# Patient Record
(demographics unavailable — no encounter records)

---

## 2024-12-05 NOTE — PHYSICAL EXAM
[General Appearance - Alert] : alert [General Appearance - In No Acute Distress] : in no acute distress [General Appearance - Well Nourished] : well nourished [General Appearance - Well Developed] : well developed [Sclera] : the sclera and conjunctiva were normal [] : no rash [Motor Exam] : the motor exam was normal [FreeTextEntry1] : There is joint line tenderness of the elbows bilaterally. Ther eis tenderness over the lateral epicondyles bilaterally.  There is joint line tenderness in the right wrist without palpable swelling. There is right first MCP joint tenderness. There is no dactylitis. There is tenderness over the superior pole of the left patella. There is tenderness of the left first MTP joint. There is no plantar tenderness. There is no pain on stressing the SI joint during provocative testing.

## 2024-12-05 NOTE — REVIEW OF SYSTEMS
[Fever] : no fever [Chills] : no chills [Eye Pain] : no eye pain [Red Eyes] : eyes not red [Shortness Of Breath] : no shortness of breath [Cough] : no cough [Abdominal Pain] : no abdominal pain [Diarrhea] : no diarrhea [Dysuria] : no dysuria [Joint Pain] : joint pain [Joint Swelling] : no joint swelling [Joint Stiffness] : joint stiffness [Skin Lesions] : no skin lesions [FreeTextEntry6] : No pleuritic C/P

## 2024-12-05 NOTE — HISTORY OF PRESENT ILLNESS
[FreeTextEntry1] : Patient is no longer taking Naproxen. Noted progression of symptoms - having symptoms suggestive of enthesitis in multiple joints.

## 2025-02-26 NOTE — HISTORY OF PRESENT ILLNESS
[FreeTextEntry1] : The patient is a 45-year-old gentleman with recurrent kidney stones.  He had shockwave lithotripsy in the past.  He presented to the emergency room with right renal colic.  A CT scan showed a right 6 mm obstructing upper ureter stone.  The patient had : -stent placement 2/13/2025 - Right URS and removal of stone and stent 3/18/2025 - Readmitted with 2 mm fragment in ureter underwent ureteroscope and stent placement 3/21  He is here for stent removal and metabolic workup for stones.  Procedure: After patient was consented he was prepped and draped. He was then given Keflex and urojet was injected. The 16 Fr flexible cysto was introduced and the stent could be visualized in the bladder and using a flexible grasper this was grasped and removed uneventfully.  Patient provided urine specimen for culture.

## 2025-02-26 NOTE — ASSESSMENT
[FreeTextEntry1] :  Discussed with the patient dietery stone prevention includin- Increase flud intake to achieve at least 2.5-3 L daily 2- Limit sodium intake and consume 3498-8321 mg per day dietry calcium 3-  Increase their citrate intake by icreasing fruits, vegetables and limit non-dairy animal protein 4- If uric acid stone limit their non-dairy animal protein Patient was also counselled on metabolic workup of stones including 24 hour urine collection to better follow her stone management and prevention. RTC 6 weeks for renal ultrasound and to discuss litholink

## 2025-04-16 NOTE — HISTORY OF PRESENT ILLNESS
[FreeTextEntry1] : The patient is a 45-year-old gentleman with recurrent kidney stones. He had shockwave lithotripsy in the past. He presented to the emergency room with right renal colic. A CT scan showed a right 6 mm obstructing upper ureter stone. The patient had : -stent placement 2/13/2025 - Right URS and removal of stone and stent 3/18/2025 - Readmitted with 2 mm fragment in ureter underwent ureteroscope and stent placement 3/21 His stent was then removed 2/26/2025 - stone analysis 80% calcium oxalate 20% calcium phosphate.  4/16/2024 Currently asymptomatic, no pain , stone passage, voiding well. Renal US in office unremarkable. Litholink shows Na 263 (<150) , Calium 324 (<250) and Uric acid 1.2 (<0.8)

## 2025-04-16 NOTE — ASSESSMENT
[FreeTextEntry1] :  Discussed with the patient dietery stone prevention includin- Increase flud intake to achieve at least 2.5-3 L daily 2- Limit sodium intake and consume 5316-7004 mg per day dietry calcium 3- uric acid stone limit their non-dairy animal protein 4- Increase their citrate intake by icreasing fruits, vegetables and limit non-dairy animal protein  Patient was also counselled on metabolic workup of stones including 24 hour urine collection to better follow her stone management and prevention. RTC 3 months telemed with new litholink, consider HCTZ if still abnormal. Repeat US in 1 year